# Patient Record
Sex: FEMALE
[De-identification: names, ages, dates, MRNs, and addresses within clinical notes are randomized per-mention and may not be internally consistent; named-entity substitution may affect disease eponyms.]

---

## 2018-02-28 ENCOUNTER — HOSPITAL ENCOUNTER (OUTPATIENT)
Dept: HOSPITAL 92 - ERS | Age: 28
Setting detail: OBSERVATION
LOS: 1 days | Discharge: HOME | End: 2018-03-01
Attending: OBSTETRICS & GYNECOLOGY | Admitting: OBSTETRICS & GYNECOLOGY
Payer: SELF-PAY

## 2018-02-28 VITALS — BODY MASS INDEX: 28.6 KG/M2

## 2018-02-28 DIAGNOSIS — N92.0: Primary | ICD-10-CM

## 2018-02-28 DIAGNOSIS — F17.200: ICD-10-CM

## 2018-02-28 DIAGNOSIS — D64.9: ICD-10-CM

## 2018-02-28 DIAGNOSIS — Z98.891: ICD-10-CM

## 2018-02-28 DIAGNOSIS — Z87.59: ICD-10-CM

## 2018-02-28 DIAGNOSIS — Z88.0: ICD-10-CM

## 2018-02-28 LAB — HGB BLD-MCNC: 7.8 G/DL (ref 12–16)

## 2018-02-28 PROCEDURE — 87591 N.GONORRHOEAE DNA AMP PROB: CPT

## 2018-02-28 PROCEDURE — 86900 BLOOD TYPING SEROLOGIC ABO: CPT

## 2018-02-28 PROCEDURE — 86850 RBC ANTIBODY SCREEN: CPT

## 2018-02-28 PROCEDURE — 85018 HEMOGLOBIN: CPT

## 2018-02-28 PROCEDURE — 87491 CHLMYD TRACH DNA AMP PROBE: CPT

## 2018-02-28 PROCEDURE — 86921 COMPATIBILITY TEST INCUBATE: CPT

## 2018-02-28 PROCEDURE — 96360 HYDRATION IV INFUSION INIT: CPT

## 2018-02-28 PROCEDURE — 76856 US EXAM PELVIC COMPLETE: CPT

## 2018-02-28 PROCEDURE — A4216 STERILE WATER/SALINE, 10 ML: HCPCS

## 2018-02-28 PROCEDURE — 36415 COLL VENOUS BLD VENIPUNCTURE: CPT

## 2018-02-28 PROCEDURE — P9016 RBC LEUKOCYTES REDUCED: HCPCS

## 2018-02-28 PROCEDURE — 86901 BLOOD TYPING SEROLOGIC RH(D): CPT

## 2018-02-28 PROCEDURE — G0378 HOSPITAL OBSERVATION PER HR: HCPCS

## 2018-02-28 PROCEDURE — 85014 HEMATOCRIT: CPT

## 2018-02-28 PROCEDURE — 36430 TRANSFUSION BLD/BLD COMPNT: CPT

## 2018-02-28 PROCEDURE — 96361 HYDRATE IV INFUSION ADD-ON: CPT

## 2018-02-28 NOTE — ULT
PELVIC SONOGRAM

TRANSABDOMINAL AND TRANSVAGINAL IMAGING WITH DUPLEX EVALUATION

2/28/18

 

HISTORY: 

Pelvic bleeding. Pain.

 

FINDINGS:  

The urinary bladder is unremarkable. The uterus has a heterogeneous echotexture and is 9.2 cm. Endome
trium is 0.8 cm. Nabothian cysts arise from the cervix. No free fluid is evident. Right ovary is 3.4 
cm. Left is 2.4 cm. Each has a normal sonographic appearance and demonstrates good color and spectral
 doppler flow. 

 

IMPRESSION:  

Normal pelvic sonogram. 

 

POS: LUCIO

## 2018-03-01 VITALS — TEMPERATURE: 99 F | DIASTOLIC BLOOD PRESSURE: 73 MMHG | SYSTOLIC BLOOD PRESSURE: 101 MMHG

## 2018-03-01 LAB — HGB BLD-MCNC: 8.7 G/DL (ref 12–16)

## 2018-03-01 NOTE — PRG
DATE OF SERVICE:  03/01/2018

 

SUBJECTIVE:  The patient feels better this morning.  She denies headache or 
dizziness.

 

OBJECTIVE:

VITAL SIGNS:  Stable.  Her blood pressure is 89/50, her pulse is 64.  Her 
temperature is 98.2 and her O2 sat is 100% on nasal cannula.

ABDOMEN:  Her abdomen remains soft.  There is minimal vaginal bleeding.

 

LABORATORY DATA:  Hemoglobin and hematocrit after her first unit were 7.8 and 
23.9 respectively.

 

ASSESSMENT:

1.  Longstanding menometrorrhagia.

2.  Severe anemia, now status post a second unit of packed red blood cells.

 

PLAN:  An H&H is pending from this morning and should that be okay, it is 
possible that she could be discharged home for followup in the clinic.

 

OMEGA

## 2018-03-01 NOTE — DIS
DATE OF ADMISSION:  02/28/2018

 

DATE OF DISCHARGE:  03/01/2018

 

DATE OF ENCOUNTER:  03/01/2018

 

ADMITTING DIAGNOSES:

1.  Severe anemia.

2.  Menorrhagia.

 

DISCHARGE DIAGNOSES:

1.  Severe anemia.

2.  Menorrhagia.

 

CONSULTATIONS:  OB/GYN.

 

HOSPITAL COURSE:  Patient is a 27-year-old female who was seen in the emergency room for heavy bleedi
ng for the last month and was noted in the [facility] ER to have a hemoglobin of 5.1.  She was transfu
sed 1 unit of packed red blood cells and transferred to [place] [facility] in [place], [place] for further manage
ment.  He has been transfused the second unit of packed red blood cells and spontaneously bleeding ha
s slowed.  After the second unit of packed red blood cells, the patient's hemoglobin was 8.7 and hema
tocrit 26.3.  When I visited her this morning, she reports she is feeling better and that her bleedin
g has discontinued spontaneously.  She reports that she has a few periods a year and will skip severa
l months at a time, this most recent period started the end of January and just now discontinued.  Lo
oking through the record, it appears she has been on birth control in the past for this problem.

 

The patient denies any dizziness, lightheadedness.  She reports she has been up to the bathroom witho
ut any problems.

 

PHYSICAL EXAMINATION:

VITAL SIGNS:  Today blood pressure is 94/52, pulse of 67, temperature 98.8, satting 100% on room air 
with respiratory rate of 16.

GENERAL:  She appears to be in no acute distress.  She is alert and oriented, cooperative and pleasan
t to interact with.

HEENT:  Normocephalic, atraumatic.

 

The patient will be discharged to home.  She has a prescription for iron and Lysteda.  I have given i
nstructions as long as she is not having any significant bleeding, the Lysteda is not necessary to ta
ke immediately.  She has also been given instructions to follow up with her primary doctor Dr. Lozoya 
in the next few days for outpatient workup of her menorrhagia and oligomenorrhea.  The ultrasound fin
dings with this hospitalization showed a normal sized uterus with no evidence of fibroids and an endo
metrial stripe of 8 mm.  All within normal limits.

## 2018-03-01 NOTE — HP
DATE OF ADMISSION:  2018

 

ADMITTING PHYSICIAN:  Reynaldo Veras M.D.

 

CHIEF COMPLAINT:  Vaginal bleeding.

 

HISTORY OF PRESENT ILLNESS:  Ms. Galarza is a 27-year-old  female, G4, 
P3-0-1-3 last normal menstrual period 3 months ago, who presents as a transfer 
from [facility] for vaginal bleeding.  She states that she has had 
continued vaginal bleeding in the form of heavy menstrual cycle since at least 
January.  She states that she has had this period off and on since she had her 
period.  She has been on birth control pills for this in the past and has done 
well.  At this point, she is evaluated by the ER here and it seemed to have 
hemoglobin of 5.1.

 

PAST OBSTETRICAL HISTORY:  Remarkable for 3 previous C-sections.  She has also 
had 1 spontaneous miscarriage.

 

PAST MEDICAL HISTORY:  Unremarkable.

 

PAST SURGICAL HISTORY:   x3.

 

CURRENT MEDICATIONS:  None.

 

ALLERGIES:  None.

 

SOCIAL HISTORY:  She smokes, but denies alcohol or drug use.

 

REVIEW OF SYSTEMS:  She reports feeling weak, but she denies chest pain, 
shortness of breath.  She reports vaginal bleeding, but denies abdominal pain 
or change in bowel or bladder habits.

 

PHYSICAL EXAMINATION:

LUNGS:  Clear to auscultation.

CARDIOVASCULAR:  Regular rate and rhythm.

ABDOMEN:  Soft and nontender.

PELVIC:  There is old dark blood in the vault.  Her cervix is easily seen and 
there are no lesions.  On bimanual exam, her uterus appears normal in size.  
Cultures are obtained.

 

LABORATORY DATA:  Hemoglobin is 5.1, hematocrit 16.2.  Beta hCG is negative.  
Formal ultrasound is pending.  Verbal report is that it is nonfocal.

 

ASSESSMENT:

1.  Severe anemia.

2.  Menorrhagia.

 

PLAN:  The patient will be admitted at this time for transfusion in an attempt 
to control her bleeding with both Lysteda and Provera.  She obtained 1 unit of 
packed red cells in the ER and she will obtain a second unit and then we will 
repeat her H and H.  She will be observed closely.

 

DARNELLD